# Patient Record
Sex: FEMALE | Race: WHITE
[De-identification: names, ages, dates, MRNs, and addresses within clinical notes are randomized per-mention and may not be internally consistent; named-entity substitution may affect disease eponyms.]

---

## 2018-09-01 ENCOUNTER — HOSPITAL ENCOUNTER (OUTPATIENT)
Dept: HOSPITAL 80 - FED | Age: 43
Setting detail: OBSERVATION
LOS: 3 days | Discharge: HOME | End: 2018-09-04
Attending: INTERNAL MEDICINE | Admitting: FAMILY MEDICINE
Payer: SELF-PAY

## 2018-09-01 DIAGNOSIS — N18.2: ICD-10-CM

## 2018-09-01 DIAGNOSIS — M54.5: ICD-10-CM

## 2018-09-01 DIAGNOSIS — Z87.442: ICD-10-CM

## 2018-09-01 DIAGNOSIS — R11.2: ICD-10-CM

## 2018-09-01 DIAGNOSIS — K70.31: ICD-10-CM

## 2018-09-01 DIAGNOSIS — R10.31: Primary | ICD-10-CM

## 2018-09-01 LAB — PLATELET # BLD: 197 10^3/UL (ref 150–400)

## 2018-09-01 PROCEDURE — G0378 HOSPITAL OBSERVATION PER HR: HCPCS

## 2018-09-01 RX ADMIN — PHENAZOPYRIDINE HYDROCHLORIDE SCH: 200 TABLET ORAL at 12:18

## 2018-09-01 RX ADMIN — ONDANSETRON PRN MG: 4 TABLET, ORALLY DISINTEGRATING ORAL at 19:06

## 2018-09-01 RX ADMIN — PANTOPRAZOLE SODIUM SCH MG: 40 INJECTION, POWDER, FOR SOLUTION INTRAVENOUS at 22:20

## 2018-09-01 RX ADMIN — ONDANSETRON PRN MG: 2 SOLUTION INTRAMUSCULAR; INTRAVENOUS at 16:18

## 2018-09-01 RX ADMIN — PANTOPRAZOLE SODIUM SCH MG: 40 INJECTION, POWDER, FOR SOLUTION INTRAVENOUS at 14:13

## 2018-09-01 RX ADMIN — MIDODRINE HYDROCHLORIDE SCH: 5 TABLET ORAL at 16:35

## 2018-09-01 RX ADMIN — ONDANSETRON PRN MG: 4 TABLET, ORALLY DISINTEGRATING ORAL at 12:05

## 2018-09-01 RX ADMIN — POTASSIUM CHLORIDE SCH MLS: 10 INJECTION, SOLUTION INTRAVENOUS at 12:17

## 2018-09-01 RX ADMIN — SODIUM CHLORIDE SCH MLS: 900 INJECTION, SOLUTION INTRAVENOUS at 09:03

## 2018-09-01 RX ADMIN — AMITRIPTYLINE HYDROCHLORIDE SCH MG: 10 TABLET, FILM COATED ORAL at 22:20

## 2018-09-01 RX ADMIN — MIDODRINE HYDROCHLORIDE SCH MG: 5 TABLET ORAL at 16:18

## 2018-09-01 RX ADMIN — GABAPENTIN SCH MG: 300 CAPSULE ORAL at 22:20

## 2018-09-01 RX ADMIN — MIDODRINE HYDROCHLORIDE SCH: 5 TABLET ORAL at 22:21

## 2018-09-01 RX ADMIN — POTASSIUM CHLORIDE SCH MLS: 10 INJECTION, SOLUTION INTRAVENOUS at 11:17

## 2018-09-01 RX ADMIN — GABAPENTIN SCH MG: 300 CAPSULE ORAL at 16:18

## 2018-09-01 NOTE — EDPHY
H & P


Stated Complaint: n/v x3 hours, abd pain, lower back pain


Time Seen by Provider: 09/01/18 07:04


HPI/ROS: 





HPI





CHIEF COMPLAINT:  Abdominal pain, flank pain.





HISTORY OF PRESENT ILLNESS:  43-year-old female, she is visiting from [facility] she is here getting a 2nd opinion about her liver disease at 

[facility].  She states prior to coming out here 

she had a stent placed in her right ureter for an obstructing 4 mm right 

ureteral stone.  She woke up late this evening around 330 in the morning with 

worsening ongoing flank pain right-sided abdominal pain with associated nausea 

vomiting.  Patient states he vomited multiple times.  She denies any fever but 

does feel chills.  Denies any chest pain shortness of breath.





Past Medical History:  End-stage liver disease, liver cirrhosis ascites liver 

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





Past Surgical History:  Recent right ureteral stent placed in [facility].





Social History:  Denies current use of alcohol.  Denies drugs





Family History:  Noncontributory








ROS   


REVIEW OF SYSTEMS:


10 Systems were reviewed and negative with the exception of the elements 

mentioned in the history of present illness.








Exam   


Constitutional nontoxic appearing,  triage nursing summary reviewed, vital 

signs reviewed, awake/alert. 


Eyes   normal conjunctivae and sclera, EOMI, PERRLA. 


HENT   normal inspection, atraumatic, moist mucus membranes, no epistaxis, neck 

supple/ no meningismus, no raccoon eyes. 


Respiratory   clear to auscultation bilaterally, normal breath sounds, no 

respiratory distress, no wheezing. 


Cardiovascular   rate normal, regular rhythm, no murmur, no edema, distal 

pulses normal. 


Gastrointestinal  mild tender palpation in the abdomen, nondistended, do not 

appreciate a significant fluid wave, no rebound, no guarding, normal bowel 

sounds, no distension, no pulsatile mass. 


Genitourinary   mild tender palpation right CVA


Musculoskeletal  no midline vertebral tenderness, full range of motion, no calf 

swelling, no tenderness of extremities, no meningismus, good pulses, 

neurovascularly intact.


Skin   pink, warm, & dry, no rash, skin atraumatic. 


Neurologic   awake, alert and oriented x 3, AAOx3, moves all 4 extremities 

equally, motor intact, sensory intact, CN II-XII intact, normal cerebellar, 

normal vision, normal speech. 


Psychiatric   normal mood/affect. 


Heme/Lymph/Immune   no lymphadenopathy.





Differential diagnosis includes but is not limited to and in no particular order

:  Bowel obstruction, appendicitis, gallbladder disease, diverticulitis, colitis

, enteritis, perforated viscus, gastritis, GERD, esophagitis, urinary tract 

infection, pyelonephritis, kidney stones, SBP





Medical Decision Making:  Plan for this patient IV establishment IV fluid bolus

, IV fentanyl for pain control 50 mcg IV Zofran nausea check urinalysis, blood 

work, CT scan abdomen pelvis without contrast for flank pain and stone and 

stent placement.  Re-evaluate.





Re-evaluation:








CT scan abdomen pelvis without IV contrast called to me by Dr. Lares, shows 

right-sided monitor pleural effusion, additionally no significant ascites on C 

T. There is a ureteral stent in the right ureter without evidence of stone.





Patient's blood work reviewed.  Additionally urinalysis reviewed.  Leukocyte 

esterase positive.  Will send for urine culture.  Will give a g of Rocephin.  

Due to ongoing pain she will need to be admitted for flank pain.








Due the patient's nausea vomiting and flank pain.  And ureteral stent with 

evidence of infection on the urinalysis I have sent urine culture.





Rocephin given.





Will admit to the hospitalist service for flank pain, UTI, possible infected 

ureteral stent.





0713: Spoke with Dr. Holm agrees to admit.


Source: Patient





- Personal History


LMP (Females 10-55): Irregular


Current Tetanus Diphtheria and Acellular Pertussis (TDAP): Yes





- Medical/Surgical History


Hx Asthma: No


Hx Chronic Respiratory Disease: No


Hx Diabetes: No


Hx Cardiac Disease: No


Hx Renal Disease: No


Hx Cirrhosis: Yes


Hx Alcoholism: No


Hx HIV/AIDS: No


Hx Splenectomy or Spleen Trauma: No


Other PMH: cirrohsis





- Social History


Smoking Status: Never smoked


Constitutional: 


 Initial Vital Signs











Temperature (C)  36.7 C   09/01/18 06:12


 


Heart Rate  76   09/01/18 06:12


 


Respiratory Rate  20   09/01/18 06:12


 


Blood Pressure  137/76 H  09/01/18 06:12


 


O2 Sat (%)  98   09/01/18 06:12








 











O2 Delivery Mode               Room Air














Allergies/Adverse Reactions: 


 





ibuprofen Allergy (Verified 09/01/18 06:11)


 








Home Medications: 














 Medication  Instructions  Recorded


 


Amitriptyline HCl [Elavil 10 mg 10 mg PO HS 09/01/18





(*)]  


 


Ascorbic Acid [Vitamin C 500 mg 1,000 mg PO BID 09/01/18





(*)]  


 


Cholecalciferol Vit D3 [Vitamin D3 1,000 units PO DAILY 09/01/18





(*)]  


 


Cyanocobalamin (Vitamin B-12) 2,500 mcg PO DAILY 09/01/18





[Vitamin B12]  


 


Escitalopram Oxalate [Lexapro] 10 mg PO DAILY 09/01/18


 


Folic Acid [Folic Acid 1 MG (*)] 1 mg PO DAILY 09/01/18


 


Furosemide [Lasix 20 MG (*)] 20 mg PO DAILY 09/01/18


 


Gabapentin [Neurontin 300 MG (*)] 300 mg PO TID 09/01/18


 


Hydrocortisone 2.5% 1 shayy TP BID PRN 09/01/18





[Hydrocortisone 2.5% cream (*)]  


 


Midodrine HCl 10 mg PO TID 09/01/18


 


Multivitamins [Multivitamin (*)] 1 each PO DAILY 09/01/18


 


Ondansetron Odt [Zofran Odt 4 mg 4 mg PO Q8H PRN 09/01/18





(*)]  


 


Potassium Cl [Klor-Con 20 meq (*)] 20 meq PO DAILY 09/01/18


 


Thiamine HCl [Vitamin B-1] 100 mg PO DAILY 09/01/18


 


Zinc Sulfate 220 mg PO DAILY 09/01/18


 


traZODone [traZODONE 50MG (*)] 50 mg PO HS 09/01/18














Medical Decision Making





- Data Points


Laboratory Results: 


 Laboratory Results





 09/01/18 06:20 





 09/01/18 06:20 








Medications Given: 


 





Amitriptyline HCl (Elavil)  10 mg PO [facility]


   Stop: 02/28/19 20:59


   Last Admin: 09/01/18 22:20 Dose:  10 mg


Gabapentin (Neurontin)  300 mg PO TID [facility]


   Stop: 02/28/19 15:59


   Last Admin: 09/01/18 22:20 Dose:  300 mg


Sodium Chloride (Ns)  1,000 mls @ 125 mls/hr IV CONT [facility]


   Stop: 02/28/19 07:44


   Last Admin: 09/01/18 09:03 Dose:  1,000 mls


Midodrine (Proamatine)  10 mg PO TID [facility]


   Stop: 02/28/19 15:59


   Last Admin: 09/01/18 22:21 Dose:  Not Given


Morphine Sulfate (Morphine)  1 - 2 mg IVP Q2 PRN


   PRN Reason: Pain, Severe Unable to Take PO


   Stop: 09/11/18 07:40


   Last Admin: 09/01/18 19:06 Dose:  2 mg


Ondansetron HCl (Zofran)  4 mg IVP Q4HRS PRN


   PRN Reason: Nausea/Vomiting, Can't Take PO


   Stop: 02/28/19 09:11


   Last Admin: 09/01/18 16:18 Dose:  4 mg


Ondansetron HCl (Zofran Odt)  4 mg PO Q4HRS PRN


   PRN Reason: Nausea/Vomiting, Use 1st


   Stop: 02/28/19 09:11


   Last Admin: 09/01/18 19:06 Dose:  4 mg


Pantoprazole Sodium (Protonix)  40 mg IVP BID [facility]


   Stop: 02/28/19 13:59


   Last Admin: 09/01/18 22:20 Dose:  40 mg


Phenazopyridine HCl (Pyridium)  200 mg PO DAILY [facility]


   Stop: 02/28/19 09:59


   Last Admin: 09/01/18 12:18 Dose:  Not Given





Discontinued Medications





Fentanyl (Sublimaze)  50 mcg IVP EDNOW ONE


   Stop: 09/01/18 06:29


   Last Admin: 09/01/18 06:30 Dose:  50 mcg


Fentanyl (Sublimaze)  50 mcg IVP EDNOW ONE


   Stop: 09/01/18 07:12


   Last Admin: 09/01/18 07:13 Dose:  50 mcg


Hydromorphone HCl (Dilaudid)  0.5 mg IVP EDNOW ONE


   Stop: 09/01/18 06:27


   Last Admin: 09/01/18 06:43 Dose:  Not Given


Sodium Chloride (Ns)  1,000 mls @ 0 mls/hr IV EDNOW ONE; Wide [facility]


   PRN Reason: Protocol


   Stop: 09/01/18 06:27


   Last Admin: 09/01/18 06:28 Dose:  1,000 mls


Ceftriaxone Sodium/Dextrose (Rocephin 1 Gm (Premix))  50 mls @ 100 mls/hr IV 

EDNOW ONE


   PRN Reason: Protocol


   Stop: 09/01/18 07:32


   Last Admin: 09/01/18 07:14 Dose:  50 mls


Potassium Chloride (Potassium Cl 10 Meq (Premix))  100 mls @ 100 mls/hr IV 

EDNOW ONE


   Stop: 09/01/18 08:07


   Last Admin: 09/01/18 07:57 Dose:  100 mls


Potassium Chloride (Potassium Cl 10 Meq (Premix))  100 mls @ 100 mls/hr IV Q1H 

[facility]


   Stop: 09/01/18 12:59


   Last Admin: 09/01/18 12:17 Dose:  100 mls


Lorazepam (Ativan Injection)  0.5 mg IVP ONCE ONE


   Stop: 09/01/18 10:46


   Last Admin: 09/01/18 10:50 Dose:  0.5 mg


Morphine Sulfate (Morphine)  1 - 2 mg IVP Q4HRS PRN


   PRN Reason: Pain, Severe Unable to Take PO


   Stop: 09/11/18 07:40


   Last Admin: 09/01/18 09:00 Dose:  2 mg


Ondansetron HCl (Zofran)  4 mg IVP EDNOW ONE


   Stop: 09/01/18 06:27


   Last Admin: 09/01/18 06:28 Dose:  4 mg


Ondansetron HCl (Zofran)  4 mg IVP EDNOW ONE


   Stop: 09/01/18 06:27


   Last Admin: 09/01/18 06:37 Dose:  Not Given


Ondansetron HCl (Zofran)  4 mg IVP Q4HRS PRN


   PRN Reason: Nausea/Vomiting, Can't Take PO


   Stop: 02/28/19 09:11


   Last Admin: 09/01/18 09:19 Dose:  4 mg


Potassium Chloride (Klor-Con)  10 - 40 meq PO ONCE ONE


   PRN Reason: Protocol


   Stop: 09/01/18 10:28


   Last Admin: 09/01/18 10:47 Dose:  Not Given


Potassium Chloride (Klor-Con)  40 meq PO ONCE ONE


   PRN Reason: Protocol


   Stop: 09/01/18 22:01


   Last Admin: 09/01/18 22:20 Dose:  40 meq








Departure





- Departure


Disposition: [facility]


Clinical Impression: 


 Flank pain, Pyelonephritis





Abdominal pain


Qualifiers:


 Abdominal location: unspecified location Qualified Code(s): R10.9 - 

Unspecified abdominal pain





UTI (urinary tract infection)


Qualifiers:


 Urinary tract infection type: acute cystitis Hematuria presence: with 

hematuria Qualified Code(s): N30.01 - Acute cystitis with hematuria





Condition: Fair

## 2018-09-01 NOTE — GHP
DATE OF ADMISSION:  09/01/2018



CHIEF COMPLAINT:  Nausea and vomiting with associated abdominal pain and lower 
back pain.



HISTORY OF PRESENT ILLNESS:  Caity Darling is a 43-year-old female who is here 
visiting from the [place], [place] area.  She came to the [place] area to get a 
2nd opinion about her liver disease, liver transplant at [facility].  Prior to coming to [place], she had a stent placed this 
past Wednesday on her right ureter for an obstructing 4 mm right ureteral 
stone.  She woke up during the early morning around 3:30 with worsening ongoing 
flank pain on the right side with associated nausea and vomiting.  She denies 
any hematemesis. She denies any fever, but told me she has had ongoing chills.  
She also complains of increased frequency, urgency, and burning with urination.
  She has been treated for a urinary tract infection for approximately a month.
  She is unclear of what antibiotic she has been taking.  During my interview 
she is complaining of ongoing nausea and pain.





PAST MEDICAL HISTORY:  

1.  End-stage liver disease.  This was diagnosed in April of 2017.  Since then, 
she has not had any alcohol.

2.  Cirrhosis.

3.  Ascites.

4.  Upper GI bleed.

5.  GERD.

6. Recurrent right pleural effusions.

7. Pyelonephritis.

8. Chronic kidney disease stage 2.

9. Kidney stone.





PAST SURGICAL HISTORY:  

1.  Right ureteral stent placement.

2.  Appendectomy.



FAMILY HISTORY:  Noncontributory.



SOCIAL HISTORY:  She does not drink alcohol.  She quit drinking the end of 
March in 2017.  She does not work.  She has been  x15 years.  She has 4 
children.



ALLERGIES:  Advil.  Please note, this is not a true allergy.  She says it makes 
her throw up blood.



MEDICATIONS:  She is unable to provide a list for me.  She said the list is at 
the hotel room and she cannot recall this list.



REVIEW OF SYSTEMS:  A 10-point review of system was performed and it was 
negative other than pertinent positives in the HPI and past medical history.



PHYSICAL EXAM:  GENERAL:  The patient does not appear toxic, but appears to be 
in pain.  VITAL SIGNS:  Blood pressure is 115/60, heart rate of 63, respiratory 
rate of 20, O2 saturation on room air 93%, temperature is 36.7 Celsius.  EYES:  
Pupils are equal and reactive.  EOMs are intact.  No conjunctival injection 
noted.  ENT:  Normal ears.  Hearing intact.  NECK:  Trachea is midline.  
CARDIOVASCULAR:  She is in a regular rate and rhythm.  No murmurs, rubs, or 
gallops noted.  CHEST:  Lungs normal.  Respiratory effort clear without wheezing
, rales, or rhonchi.  ABDOMEN:  Soft.  It is tender on both sides.  She has no 
guarding.  She has normal bowel sounds.  No distention is noted.  SKIN:  No 
rashes.  MUSCULOSKELETAL:  She was able to get out of bed easily to the 
commode.  PSYCHIATRIC:  She is alert, oriented, and very anxious.  Appears to 
have normal judgment, insight and normal memory.



DATA:  Reviewed.  A CBC shows a white blood cell count of 8.9, hemoglobin of 
10.5, hematocrit of 31.8, platelet count of 197.  Chemistry:  Sodium is 143, 
potassium 2.9, chloride of 109, CO2 of 24, BUN 18, creatinine 0.9, glucose 110.
  Protein is 5.4, albumin 3.1.  Pregnancy screen is negative.  AST is 34, ALT 
is 32, alkaline phosphatase is 84.  Urinalysis shows trace ketones, 3+ blood, 3
+ leukocyte esterase,  RBCs, 25-50 WBCs.  Urine culture is pending. 



CT of the abdomen and pelvis shows a moderate right pleural effusion, prior 
TIPS.  She has a right ureteral stent with mild residual hydronephrosis.



ASSESSMENT/PLAN:  

1.  Flank pain, pyelonephritis, but cannot rule out that she has an infected 
stent.  Will follow up with urine cultures.  Will place her on ceftriaxone.  I 
spoke with Urology to have them further evaluate the patient's imaging and give 
guidance as far as input.  Also, the patient said she has had a urinary tract 
infection for a month.  Have asked the nursing staff to get her information 
from the [place], [place] area so we can see what she has been treated with and 
what her sensitivities are. Will also place her on Pyridium. 

2.  Nausea and vomiting. Will keep her NPO for now.  Continue IV fluids and 
antiemetics.

3.  End-stage liver disease with a history of cirrhosis.  She is here for 
further evaluation with [facility].  Further follow up with them. 
Will resume her home medications.

4.  Hypokalemia.  Have placed her on the potassium protocol.

5.  Anemia. Unclear of her baseline. Will review her previous records.

6.  Right pleural effusion. She is oxygenating well. Secondary from  end stage 
liver disease. Her last thoracentesis was on 8/27.

7.  Deep venous thrombosis prophylaxis, low risk.

8.  Length of stay.  She will likely require less than a 2-midnight stay, which 
will make her observation status.





Job #:  [id number] and [id number]

[facility]

## 2018-09-01 NOTE — GCON
DATE OF CONSULTATION:  09/01/2018



CHIEF COMPLAINT:  Nausea, vomiting, and abdominal pain.  Possible gastrointestinal bleed, possible st
ent discomfort.



HISTORY OF PRESENT ILLNESS:  The patient is a 43-year-old female, visiting from [place], [place].  She
 came to [place] to receive a 2nd opinion about her liver disease in an interest to obtain liver trans
plant.  She does have a history of severe alcoholism in the past.  She had a stent placed on Wednesda
y in [place], [place] for a 4 mm obstructing right ureteral stone.  She woke up this morning with benji
re pain, nausea, and vomiting.  She does have a history of UTIs and her urine looks like it is infect
ed.  Her CT scan shows that the stent is in good position.  There is mild pelvic caliectasis on the C
T, and she is not aware of what bug she is growing or what antibiotic she was taking.  I have found h
er to be difficult to obtain a history from, a difficult examination, and she refuses to answer quest
ions during my interview.



PAST MEDICAL HISTORY:  End-stage liver disease, cirrhosis with ascites, history of upper GI bleed.



PAST SURGICAL HISTORY:  Recent right stent placement, and an appendectomy.



SOCIAL HISTORY:  She states she does not drink alcohol.  She does not work.  She has 4 children.  She
 is  for 15 years.



ALLERGIES:  Advil.



MEDICATIONS:  We were unable to obtain this.



REVIEW OF SYSTEMS:  10-point review of systems is positive for abdominal pain, severe anxiety and christina
bility to reorient the patient for obtaining a history.



PHYSICAL EXAMINATION:  GENERAL:  She does not appear toxic, she does appear to be in pain.  VITAL SIG
NS:  Blood pressures are stable.  Heart rate is 63, respiratory rate is 20, oxygen saturation 93% on 
room air.  She is afebrile.  HEENT:  Normocephalic, atraumatic.  NECK:  Trachea is midline.  CARDIOVA
SCULAR:  Regular rate and rhythm.  CHEST:  No retractions or pursed lip breathing.  No cyanosis.  ABD
OMEN:  Soft, nontender, nondistended.  SKIN:  No rashes or lesions.  MUSCULOSKELETAL:  She is able to
 get out of bed for the hospitalist service.  I have not seen her do this, but she is moving around [facility].  PSYCHIATRIC:  Extremely anxious, difficult to reorient.



LABS:  Her white count is 9, H and H is 10.5 and 32, creatinine is 0.9.  I reviewed the CT scan with 
the radiologist, and she has a right ureteral stent with some mild pelvic caliectasis.



ASSESSMENT AND PLAN:  Flank pain due to pyelonephritis.  This is likely due to a urinary tract infect
ion, transferring up to the kidney as pyelonephritis through the stent.  We will continue urine cultu
res.  She is on ceftriaxone.  I agree with Pyridium and I recommend Ativan.  I do not recommend surge
ry at this point.  As she still has a stone in place with an active pyelonephritis, it is not safe to
 treat with stent exchange.  However, we could consider a nephrostomy tube with Interventional Radiol
ogy, with bedside removal of the infected stent.  This would be the safest treatment plan for her inf
ected kidney, ureter and bladder.  We will continue antibiotics, and I will reassess tomorrow.





Job #:  [id number]

## 2018-09-02 LAB — PLATELET # BLD: 152 10^3/UL (ref 150–400)

## 2018-09-02 RX ADMIN — CYANOCOBALAMIN TAB 1000 MCG SCH MCG: 1000 TAB at 08:39

## 2018-09-02 RX ADMIN — Medication SCH MG: at 08:41

## 2018-09-02 RX ADMIN — FOLIC ACID SCH MG: 1 TABLET ORAL at 08:41

## 2018-09-02 RX ADMIN — GABAPENTIN SCH MG: 300 CAPSULE ORAL at 08:41

## 2018-09-02 RX ADMIN — PANTOPRAZOLE SODIUM SCH MG: 40 INJECTION, POWDER, FOR SOLUTION INTRAVENOUS at 20:57

## 2018-09-02 RX ADMIN — AMITRIPTYLINE HYDROCHLORIDE SCH MG: 10 TABLET, FILM COATED ORAL at 20:58

## 2018-09-02 RX ADMIN — FUROSEMIDE SCH MG: 20 TABLET ORAL at 08:41

## 2018-09-02 RX ADMIN — MIDODRINE HYDROCHLORIDE SCH: 5 TABLET ORAL at 08:50

## 2018-09-02 RX ADMIN — ONDANSETRON PRN MG: 2 SOLUTION INTRAMUSCULAR; INTRAVENOUS at 20:57

## 2018-09-02 RX ADMIN — GABAPENTIN SCH MG: 300 CAPSULE ORAL at 16:11

## 2018-09-02 RX ADMIN — MIDODRINE HYDROCHLORIDE SCH: 5 TABLET ORAL at 16:13

## 2018-09-02 RX ADMIN — ONDANSETRON PRN MG: 4 TABLET, ORALLY DISINTEGRATING ORAL at 16:11

## 2018-09-02 RX ADMIN — POTASSIUM CHLORIDE SCH MEQ: 1500 TABLET, EXTENDED RELEASE ORAL at 09:48

## 2018-09-02 RX ADMIN — PHENAZOPYRIDINE HYDROCHLORIDE SCH MG: 200 TABLET ORAL at 08:40

## 2018-09-02 RX ADMIN — MIDODRINE HYDROCHLORIDE SCH MG: 5 TABLET ORAL at 21:03

## 2018-09-02 RX ADMIN — SODIUM CHLORIDE SCH MLS: 900 INJECTION, SOLUTION INTRAVENOUS at 03:00

## 2018-09-02 RX ADMIN — ONDANSETRON PRN MG: 4 TABLET, ORALLY DISINTEGRATING ORAL at 11:30

## 2018-09-02 RX ADMIN — PANTOPRAZOLE SODIUM SCH MG: 40 INJECTION, POWDER, FOR SOLUTION INTRAVENOUS at 08:42

## 2018-09-02 RX ADMIN — GABAPENTIN SCH MG: 300 CAPSULE ORAL at 20:58

## 2018-09-02 RX ADMIN — SODIUM CHLORIDE SCH MLS: 900 INJECTION, SOLUTION INTRAVENOUS at 11:29

## 2018-09-02 NOTE — ASMTCMCOM
CM Note

 

CM Note                       

Notes:

Pt admitted for N/V, abd and lower back pain, UTI and pyelonephritis r/t infected ureter stent; pt 

also has right pleural effusion secondary to ESLD. 



Pt is visiting from [facility] w/her , Prasanna, to get a 2nd opinion about her ESLD and hopes to 


obtain a liver transplant at [facility]; pt has a history of ETOH abuse but reports 

being sober since Spring 2017 when she was diagnosed. 



Pt recently had a stent placed in her right ureter due to a stone on Wed. 8/29 in [place]. Pt states she 


has been treated for a UTI for about a month but was unable to tell providers what antibiotic she 

has been taking. Pt hasn't been able to provide a medication list to providers yet. 



Urology consulted; Dr Lucia does not recommend surgery at this time; considering a nephrostomy tube 


w/IR w/bedside removal of infected stent. Plan to continue antibiotics and Dr Lucia to reassess 

tomorrow. 



CM to follow. 



 



 



 

 

Date Signed:  09/02/2018 04:58 PM

Electronically Signed By:Mikala Noguera RN

## 2018-09-02 NOTE — HOSPPROG
Hospitalist Progress Note


Assessment/Plan: 





44 yo F with PMH of ESLD 2/2 etoh as well as recent ureteral stent placement pw 

abd pain/nv/v with concerns for UTI





# pyuria: mostly blood in the urine with some wbc and leuk esterase, no 

elevation in wbc or sirs criteria, initial urine cultures showing yeast and 

normal urogenital ewa at low counts. Suspect this is not related to infection

, will monitor cultures overnight and continue abx for now. This is complicated 

by patients report of being on abx for 1 month for recurrent UTI recently


# ureteral stent: placed for obstructing ureteral stone, discussed with urology 

that stent exchange now could cause scarring and other issues, as above, will 

continue to monitor


# n/v/abd pain: unlcear etiology, improved but requiring ongoing medications, 

query  large component of anxiety and perhaps opiate tolerance--will try to 

obtain records from out of state and PDMP 


# ESLD: 2/2 EToh, here for eval for possible liver transplantation, she is s/p 

TIPS


# observation status


Patient new to my care. REcords reviewed including H&P by Eugene and consult 

note by Rea. Plan reviewed with Dr. Lucia. 





Subjective: no significant overnight events, patient states she is very 

somnolent and continues ot have n/v/abd pain


Objective: 


 Vital Signs











Temp Pulse Resp BP Pulse Ox


 


 37.6 C   70   18   127/75 H  90 L


 


 09/02/18 15:35  09/02/18 15:35  09/02/18 15:35  09/02/18 15:35  09/02/18 15:35








 Laboratory Results





 09/02/18 07:56 





 09/02/18 07:56 





 











 09/01/18 09/02/18 09/03/18





 05:59 05:59 05:59


 


Intake Total  2000 1375


 


Balance  2000 1375








awake alert somnolent


anicteric


op clear


rrr no mrg


cta b


soft nt nd


no cce


warm dry well perfused





ICD10 Worksheet


Patient Problems: 


 Problems











Problem Status Onset


 


Abdominal pain Acute  


 


Flank pain Acute  


 


Pyelonephritis Acute  


 


UTI (urinary tract infection) Acute

## 2018-09-03 RX ADMIN — ONDANSETRON PRN MG: 2 SOLUTION INTRAMUSCULAR; INTRAVENOUS at 17:29

## 2018-09-03 RX ADMIN — MIDODRINE HYDROCHLORIDE SCH MG: 5 TABLET ORAL at 21:08

## 2018-09-03 RX ADMIN — ONDANSETRON PRN MG: 2 SOLUTION INTRAMUSCULAR; INTRAVENOUS at 09:03

## 2018-09-03 RX ADMIN — Medication SCH MG: at 09:12

## 2018-09-03 RX ADMIN — SODIUM CHLORIDE SCH MLS: 900 INJECTION, SOLUTION INTRAVENOUS at 13:18

## 2018-09-03 RX ADMIN — GABAPENTIN SCH MG: 300 CAPSULE ORAL at 09:12

## 2018-09-03 RX ADMIN — ONDANSETRON PRN MG: 2 SOLUTION INTRAMUSCULAR; INTRAVENOUS at 03:25

## 2018-09-03 RX ADMIN — GABAPENTIN SCH MG: 300 CAPSULE ORAL at 20:31

## 2018-09-03 RX ADMIN — ONDANSETRON PRN MG: 2 SOLUTION INTRAMUSCULAR; INTRAVENOUS at 13:17

## 2018-09-03 RX ADMIN — PANTOPRAZOLE SODIUM SCH MG: 40 INJECTION, POWDER, FOR SOLUTION INTRAVENOUS at 20:30

## 2018-09-03 RX ADMIN — FOLIC ACID SCH MG: 1 TABLET ORAL at 09:12

## 2018-09-03 RX ADMIN — AMITRIPTYLINE HYDROCHLORIDE SCH MG: 10 TABLET, FILM COATED ORAL at 20:32

## 2018-09-03 RX ADMIN — PANTOPRAZOLE SODIUM SCH MG: 40 INJECTION, POWDER, FOR SOLUTION INTRAVENOUS at 09:09

## 2018-09-03 RX ADMIN — GABAPENTIN SCH MG: 300 CAPSULE ORAL at 16:36

## 2018-09-03 RX ADMIN — SODIUM CHLORIDE SCH MLS: 900 INJECTION, SOLUTION INTRAVENOUS at 21:43

## 2018-09-03 RX ADMIN — POTASSIUM CHLORIDE SCH MEQ: 1500 TABLET, EXTENDED RELEASE ORAL at 09:12

## 2018-09-03 RX ADMIN — FUROSEMIDE SCH MG: 20 TABLET ORAL at 09:12

## 2018-09-03 RX ADMIN — OXYCODONE HYDROCHLORIDE PRN MG: 15 TABLET ORAL at 20:36

## 2018-09-03 RX ADMIN — PHENAZOPYRIDINE HYDROCHLORIDE SCH MG: 200 TABLET ORAL at 09:12

## 2018-09-03 RX ADMIN — MIDODRINE HYDROCHLORIDE SCH MG: 5 TABLET ORAL at 09:18

## 2018-09-03 RX ADMIN — OXYCODONE HYDROCHLORIDE PRN MG: 15 TABLET ORAL at 15:29

## 2018-09-03 RX ADMIN — MIDODRINE HYDROCHLORIDE SCH: 5 TABLET ORAL at 16:44

## 2018-09-03 RX ADMIN — CYANOCOBALAMIN TAB 1000 MCG SCH MCG: 1000 TAB at 09:13

## 2018-09-03 NOTE — HOSPPROG
Hospitalist Progress Note


Assessment/Plan: 





42 yo F with PMH of ESLD 2/2 etoh as well as recent ureteral stent placement pw 

abd pain/nv/v with concerns for UTI





# n/v/abd pain: unclear etiology, improved but requiring ongoing medications, 

does have some behaviors concerning for possible opiate seeking, discussed at 

length with urology, could be related to stent placement as below and will dc 

that today in case it is contributing. Patient states she is unable to eat due 

to pain/n and remains NPO at this time. Will need to advance diet as we are 

able. 


# pyuria: mostly blood in the urine with some wbc and leuk esterase, no 

elevation in wbc or sirs criteria, initial urine cultures showing yeast and 

normal urogenital ewa at low counts. Suspect this is not related to 

infection. This is complicated by patients report of being on abx for 1 month 

for recurrent UTI recently. Will dc abx after stent removal as next


# ureteral stent: placed for obstructing ureteral stone, plan to remove stent 

today for concerns it is the etiology of her abd pain as next, if this does not 

improve pain may need to discuss nephrostomy tube with IR in discussion with 

urology today


# ESLD: 2/2 EToh, here for eval for possible liver transplantation, she is s/p 

TIPS


# inpatient status


 Plan reviewed with Dr. Lucia. 





Subjective: no significant overnight events, patient alternately somnolent or 

yelling in pain


Objective: 


 Vital Signs











Temp Pulse Resp BP Pulse Ox


 


 36.8 C   64   16   133/78 H  92 


 


 09/03/18 13:27  09/03/18 13:27  09/03/18 13:27  09/03/18 13:27  09/03/18 13:27








 Laboratory Results





 09/02/18 07:56 





 09/03/18 04:40 





 











 09/02/18 09/03/18 09/04/18





 05:59 05:59 05:59


 


Intake Total 2000 2775 


 


Output Total   700


 


Balance 2000 2775 -700








somnolent


anicteric


op clear


rrr no mrg


cta b


soft nt nd


no cce


warm dry well perfused








ICD10 Worksheet


Patient Problems: 


 Problems











Problem Status Onset


 


Abdominal pain Acute  


 


Flank pain Acute  


 


Pyelonephritis Acute  


 


UTI (urinary tract infection) Acute

## 2018-09-03 NOTE — SOAPPROG
SOAP Progress Note


Assessment/Plan: 


Assessment:


Pain control an issue  - team is suspicious of drug seeking - 


Reasonable to remove stent - I reviewed the images w rads again and no stone 

noted along ureter per rads read























Plan: Bedside stent removal 





09/03/18 11:40





Subjective: 





Difficult to examine, positive physical exam, including retrobulbar micturalgia

, does not answer questions, just states "im in pain"


Objective: 





 Vital Signs











Temp Pulse Resp BP Pulse Ox


 


 36.9 C   71   16   137/79 H  93 


 


 09/03/18 08:40  09/03/18 08:40  09/03/18 08:40  09/03/18 08:40  09/03/18 08:40








 Laboratory Results





 09/02/18 07:56 





 09/03/18 04:40 





 











 09/02/18 09/03/18 09/04/18





 05:59 05:59 05:59


 


Intake Total 2000 6592 


 


Balance 2000 7963 














Physical Exam





- Physical Exam


EENT: PERRL/EOMI


Neck: other (positive for pain)


Respiratory: other (positive for pain )


Cardiac/Chest: other (positive for pain )


Abdomen: other (positive for pain )


Pelvic Exam: deferred


Rectal: deferred


Back: Other (positive for pain )


Skin: other (positive for pain )


Lymphatic: other (positive for pain )


Extremities: other (positive for pain )





ICD10 Worksheet


Patient Problems: 


 Problems











Problem Status Onset


 


Abdominal pain Acute  


 


Flank pain Acute  


 


Pyelonephritis Acute  


 


UTI (urinary tract infection) Acute

## 2018-09-04 VITALS — SYSTOLIC BLOOD PRESSURE: 115 MMHG | DIASTOLIC BLOOD PRESSURE: 67 MMHG

## 2018-09-04 LAB — PLATELET # BLD: 159 10^3/UL (ref 150–400)

## 2018-09-04 RX ADMIN — GABAPENTIN SCH MG: 300 CAPSULE ORAL at 16:00

## 2018-09-04 RX ADMIN — GABAPENTIN SCH MG: 300 CAPSULE ORAL at 09:26

## 2018-09-04 RX ADMIN — FUROSEMIDE SCH MG: 20 TABLET ORAL at 09:26

## 2018-09-04 RX ADMIN — FOLIC ACID SCH MG: 1 TABLET ORAL at 09:26

## 2018-09-04 RX ADMIN — SODIUM CHLORIDE SCH MLS: 900 INJECTION, SOLUTION INTRAVENOUS at 06:13

## 2018-09-04 RX ADMIN — ONDANSETRON PRN MG: 4 TABLET, ORALLY DISINTEGRATING ORAL at 13:12

## 2018-09-04 RX ADMIN — OXYCODONE HYDROCHLORIDE PRN MG: 15 TABLET ORAL at 09:26

## 2018-09-04 RX ADMIN — MIDODRINE HYDROCHLORIDE SCH: 5 TABLET ORAL at 09:38

## 2018-09-04 RX ADMIN — OXYCODONE HYDROCHLORIDE PRN MG: 15 TABLET ORAL at 13:12

## 2018-09-04 RX ADMIN — Medication SCH MG: at 09:26

## 2018-09-04 RX ADMIN — MIDODRINE HYDROCHLORIDE SCH: 5 TABLET ORAL at 16:01

## 2018-09-04 RX ADMIN — PANTOPRAZOLE SODIUM SCH MG: 40 INJECTION, POWDER, FOR SOLUTION INTRAVENOUS at 09:25

## 2018-09-04 RX ADMIN — CYANOCOBALAMIN TAB 1000 MCG SCH MCG: 1000 TAB at 09:25

## 2018-09-04 RX ADMIN — POTASSIUM CHLORIDE SCH MEQ: 1500 TABLET, EXTENDED RELEASE ORAL at 09:26

## 2018-09-04 RX ADMIN — OXYCODONE HYDROCHLORIDE PRN MG: 15 TABLET ORAL at 02:43

## 2018-09-04 NOTE — GDS
DISCHARGE DIAGNOSES:  

1.  Cirrhosis.  

2.  Urodynia. 

3.  Ureteral stent secondary to stone, done at an outside hospital.  





Please see admission history and physical by Zoraida Knight.  The patient presented with abdominal p
ain on the 1st.  She was traveling to [place] for a 2nd opinion about her liver disease.  She had re
cently had a 4 mm stone with a stent placed in [place], where she lives.  She had worsening pain.  She 
had pyuria and had received some antibiotics, although her urine cultures ultimately grew nothing out
.  Her CT scan revealed her stone had passed.  Urology saw her and declined to remove the stent.  The
re was no evidence of hydronephrosis and her renal function was normal.  The patient is discharged ho
me with limited prescriptions for oxycodone and Zofran.





Job #:  [id number]

## 2018-09-04 NOTE — HOSPPROG
Hospitalist Progress Note


Assessment/Plan: 





44 yo F w cirrhosis here w pain following  recent ureteral stent


stone has passed    


nit infected


home today


> 30 minutes


Subjective: afebrile.  urine w low colony count


Objective: 


 Vital Signs











Temp Pulse Resp BP Pulse Ox


 


 36.7 C   73   16   115/67   92 


 


 09/04/18 12:41  09/04/18 12:41  09/04/18 12:41  09/04/18 12:41  09/04/18 12:41








 Laboratory Results





 09/04/18 03:58 





 09/04/18 03:58 





 











 09/03/18 09/04/18 09/05/18





 05:59 05:59 05:59


 


Intake Total 2775 3460 


 


Output Total  2300 900


 


Balance 2775 1160 -900














- Physical Exam


Constitutional: no apparent distress, appears nourished


Eyes: PERRL, anicteric sclera


Ears, Nose, Mouth, Throat: moist mucous membranes, hearing normal


Cardiovascular: regular rate and rhythym, no murmur, rub, or gallop


Respiratory: no respiratory distress, no rales or rhonchi


Gastrointestinal: normoactive bowel sounds, soft, non-tender abdomen


Genitourinary: no bladder fullness, No collazo in urethra


Skin: warm, normal color


Musculoskeletal: full muscle strength, no muscle tenderness


Neurologic: AAOx3





ICD10 Worksheet


Patient Problems: 


 Problems











Problem Status Onset


 


Abdominal pain Acute  


 


Flank pain Acute  


 


Pyelonephritis Acute  


 


UTI (urinary tract infection) Acute

## 2018-09-04 NOTE — ASMTLACE
LACE

 

Length of stay for            Answers:  3 days                                

current admission                                                             

Acuity / Level of             Answers:  No                                    

Care: Did the patient                                                         

have an inpatient                                                             

admission?                                                                    

Comorbidities - select        Answers:  Moderate or severe liver              

all that apply                          or renal disease                      

                                        Other                         Notes:  ESRD, Cirrhosis, ascite
s, 

                                                                              GERD, recurrent righ 

                                                                              tpleural 

                                                                              effusions, pyelonephrit
is,

                                                                               CKD stage 2, kidney 

                                                                              stones

# of Emergency department     Answers:  1-2                                   

visits in the last 6                                                          

months                                                                        

Social determinants           Answers:  History of substance                  

                                        abuse (ETOH, street                   

                                        drugs, prescription                   

                                        drugs, etc.)                          

Score: 12

 

Date Signed:  09/04/2018 02:45 PM

Electronically Signed By:Carisa Rider